# Patient Record
Sex: FEMALE | Race: WHITE | NOT HISPANIC OR LATINO | Employment: FULL TIME | URBAN - METROPOLITAN AREA
[De-identification: names, ages, dates, MRNs, and addresses within clinical notes are randomized per-mention and may not be internally consistent; named-entity substitution may affect disease eponyms.]

---

## 2022-10-09 ENCOUNTER — OFFICE VISIT (OUTPATIENT)
Dept: URGENT CARE | Facility: PHYSICIAN GROUP | Age: 62
End: 2022-10-09
Payer: COMMERCIAL

## 2022-10-09 ENCOUNTER — HOSPITAL ENCOUNTER (OUTPATIENT)
Dept: RADIOLOGY | Facility: MEDICAL CENTER | Age: 62
End: 2022-10-09
Attending: FAMILY MEDICINE
Payer: COMMERCIAL

## 2022-10-09 VITALS
RESPIRATION RATE: 20 BRPM | OXYGEN SATURATION: 94 % | SYSTOLIC BLOOD PRESSURE: 120 MMHG | HEART RATE: 97 BPM | HEIGHT: 63 IN | TEMPERATURE: 98.1 F | BODY MASS INDEX: 41.64 KG/M2 | WEIGHT: 235 LBS | DIASTOLIC BLOOD PRESSURE: 62 MMHG

## 2022-10-09 DIAGNOSIS — S80.02XA CONTUSION OF LEFT KNEE, INITIAL ENCOUNTER: ICD-10-CM

## 2022-10-09 DIAGNOSIS — S89.92XA INJURY OF LEFT KNEE, INITIAL ENCOUNTER: ICD-10-CM

## 2022-10-09 DIAGNOSIS — M25.562 LATERAL JOINT LINE TENDERNESS OF KNEE, LEFT: ICD-10-CM

## 2022-10-09 PROCEDURE — 99203 OFFICE O/P NEW LOW 30 MIN: CPT | Performed by: FAMILY MEDICINE

## 2022-10-09 PROCEDURE — 73564 X-RAY EXAM KNEE 4 OR MORE: CPT | Mod: LT

## 2022-10-09 RX ORDER — LATANOPROST 50 UG/ML
1 SOLUTION/ DROPS OPHTHALMIC PRN
COMMUNITY
Start: 2022-09-03 | End: 2023-06-04

## 2022-10-09 RX ORDER — KETOROLAC TROMETHAMINE 30 MG/ML
30 INJECTION, SOLUTION INTRAMUSCULAR; INTRAVENOUS ONCE
Status: COMPLETED | OUTPATIENT
Start: 2022-10-09 | End: 2022-10-09

## 2022-10-09 RX ORDER — MELOXICAM 15 MG/1
15 TABLET ORAL DAILY
Qty: 30 TABLET | Refills: 0 | Status: SHIPPED | OUTPATIENT
Start: 2022-10-09 | End: 2022-11-08

## 2022-10-09 RX ORDER — METOPROLOL TARTRATE AND HYDROCHLOROTHIAZIDE 100; 25 MG/1; MG/1
1 TABLET ORAL PRN
COMMUNITY
End: 2023-02-07

## 2022-10-09 RX ADMIN — KETOROLAC TROMETHAMINE 30 MG: 30 INJECTION, SOLUTION INTRAMUSCULAR; INTRAVENOUS at 16:35

## 2022-10-12 ASSESSMENT — ENCOUNTER SYMPTOMS
SENSORY CHANGE: 0
FOCAL WEAKNESS: 0
WEIGHT LOSS: 0
ROS SKIN COMMENTS: NO ABRASION OR LACERATION
MYALGIAS: 0

## 2022-10-12 NOTE — PROGRESS NOTES
"Subjective     Leida Garcia is a 62 y.o. female who presents with Knee Injury (Fell on ground. Hit L knee on ground/Swelling and redness present./Onset 1 hour)            Onset today ground-level fall striking left knee to ground.  Lateral and posterior pain is moderate to severe.  Pain radiates to leg.  No sensation of locking.  No prior injury or surgery.  No OTC medications.  No other aggravating or alleviating factors.      Review of Systems   Constitutional:  Negative for malaise/fatigue and weight loss.   Musculoskeletal:  Negative for joint pain and myalgias.   Skin:         No abrasion or laceration     Neurological:  Negative for sensory change and focal weakness.            Objective     /62 (BP Location: Right arm, Patient Position: Sitting, BP Cuff Size: Large adult long)   Pulse 97   Temp 36.7 °C (98.1 °F) (Temporal)   Resp 20   Ht 1.6 m (5' 3\")   Wt 107 kg (235 lb)   SpO2 94%   BMI 41.63 kg/m²      Physical Exam  Constitutional:       Appearance: Normal appearance.   Musculoskeletal:      Comments: Left knee: Tender posterior and lateral aspect.  Tender proximal tibia.  Tender lateral joint line.  Range of motion is intact.  No obvious effusion.  Stable.  Distal neurovascular intact.   Skin:     General: Skin is warm and dry.   Neurological:      Mental Status: She is alert.                           Assessment & Plan   Xray: no fracture or dislocation per radiology       1. Injury of left knee, initial encounter    - DX-KNEE COMPLETE 4+ LEFT; Future  - ketorolac (TORADOL) injection 30 mg    2. Contusion of left knee, initial encounter    - Referral to Orthopedics  - meloxicam (MOBIC) 15 MG tablet; Take 1 Tablet by mouth every day for 30 days.  Dispense: 30 Tablet; Refill: 0    3. Lateral joint line tenderness of knee, left    - Referral to Orthopedics     Differential diagnosis, natural history, supportive care, and indications for immediate follow-up discussed at length. "     Relative rest, ice, nsaid prn. Elevation and compression prn swelling. Resume activity as tolerated.    Knee brace as needed.  Follow-up Ortho.

## 2023-02-07 PROBLEM — S83.232A COMPLEX TEAR OF MEDIAL MENISCUS OF LEFT KNEE AS CURRENT INJURY: Status: ACTIVE | Noted: 2023-02-07

## 2023-06-04 ENCOUNTER — OFFICE VISIT (OUTPATIENT)
Dept: URGENT CARE | Facility: PHYSICIAN GROUP | Age: 63
End: 2023-06-04
Payer: COMMERCIAL

## 2023-06-04 VITALS
BODY MASS INDEX: 40.12 KG/M2 | DIASTOLIC BLOOD PRESSURE: 82 MMHG | WEIGHT: 235 LBS | TEMPERATURE: 97.7 F | SYSTOLIC BLOOD PRESSURE: 140 MMHG | HEIGHT: 64 IN | RESPIRATION RATE: 16 BRPM | OXYGEN SATURATION: 96 % | HEART RATE: 87 BPM

## 2023-06-04 DIAGNOSIS — R00.0 TACHYCARDIA: ICD-10-CM

## 2023-06-04 DIAGNOSIS — I10 HYPERTENSION, UNSPECIFIED TYPE: ICD-10-CM

## 2023-06-04 PROCEDURE — 99214 OFFICE O/P EST MOD 30 MIN: CPT | Performed by: FAMILY MEDICINE

## 2023-06-04 PROCEDURE — 3077F SYST BP >= 140 MM HG: CPT | Performed by: FAMILY MEDICINE

## 2023-06-04 PROCEDURE — 3079F DIAST BP 80-89 MM HG: CPT | Performed by: FAMILY MEDICINE

## 2023-06-04 NOTE — PROGRESS NOTES
"  Subjective:      62 y.o. female presents to urgent care for refill of her metoprolol.  She is 25 mg 2 times daily.  Has been doing this off and on for years.  Most recently has been doing it since March 2023.  She took her last pill last night.  States her heart rate increases fairly significantly with stress and anxiety.  She is going through a lawsuit currently which is causing her increased stress and anxiety.  This also helps keep her blood pressure under control.  She denies any chest pain, palpitations, or shortness of breath presently.  She drinks 1-2 caffeinated beverages per day, 0-2 alcoholic beverages per week.  She does not get formal exercise.  She does try to eat mostly homemade foods.    She denies any other questions or concerns at this time.    Current problem list, medication, and past medical/surgical history were reviewed in Epic.    ROS  See HPI     Objective:      BP (!) 140/82 (BP Location: Right arm, Patient Position: Sitting, BP Cuff Size: Adult long)   Pulse 87   Temp 36.5 °C (97.7 °F) (Temporal)   Resp 16   Ht 1.626 m (5' 4\")   Wt 107 kg (235 lb)   SpO2 96%   BMI 40.34 kg/m²     Physical Exam  Constitutional:       General: She is not in acute distress.     Appearance: She is not diaphoretic.   Cardiovascular:      Rate and Rhythm: Normal rate and regular rhythm.      Heart sounds: Normal heart sounds.   Pulmonary:      Effort: Pulmonary effort is normal. No respiratory distress.      Breath sounds: Normal breath sounds.   Neurological:      Mental Status: She is alert.   Psychiatric:         Mood and Affect: Affect normal.         Judgment: Judgment normal.       Assessment/Plan:     1. Hypertension, unspecified type  2. Tachycardia  1 month supply of metoprolol has been provided.  Referral to establish care with PCP has been placed.  - Referral to establish with [facility] PCP  - metoprolol tartrate (LOPRESSOR) 25 MG Tab; Take 1 Tablet by mouth 2 times a day.  Dispense: 60 Tablet; " Refill: 0      Instructed to return to Urgent Care or nearest Emergency Department if symptoms fail to improve, for any change in condition, further concerns, or new concerning symptoms. Patient states understanding of the plan of care and discharge instructions.    Kourtney Ornelas M.D.

## 2023-06-20 ENCOUNTER — OFFICE VISIT (OUTPATIENT)
Dept: MEDICAL GROUP | Facility: PHYSICIAN GROUP | Age: 63
End: 2023-06-20
Attending: FAMILY MEDICINE
Payer: COMMERCIAL

## 2023-06-20 VITALS
HEIGHT: 64 IN | TEMPERATURE: 97.3 F | HEART RATE: 71 BPM | RESPIRATION RATE: 16 BRPM | OXYGEN SATURATION: 97 % | DIASTOLIC BLOOD PRESSURE: 74 MMHG | SYSTOLIC BLOOD PRESSURE: 128 MMHG | WEIGHT: 261 LBS | BODY MASS INDEX: 44.56 KG/M2

## 2023-06-20 DIAGNOSIS — Z76.89 ENCOUNTER TO ESTABLISH CARE: ICD-10-CM

## 2023-06-20 DIAGNOSIS — I10 HYPERTENSION, UNSPECIFIED TYPE: ICD-10-CM

## 2023-06-20 DIAGNOSIS — E66.01 MORBID OBESITY WITH BMI OF 40.0-44.9, ADULT (HCC): ICD-10-CM

## 2023-06-20 DIAGNOSIS — Z12.31 BREAST CANCER SCREENING BY MAMMOGRAM: ICD-10-CM

## 2023-06-20 DIAGNOSIS — R00.0 TACHYCARDIA: ICD-10-CM

## 2023-06-20 DIAGNOSIS — F32.9 REACTIVE DEPRESSION: ICD-10-CM

## 2023-06-20 DIAGNOSIS — H61.21 EXCESSIVE CERUMEN IN RIGHT EAR CANAL: ICD-10-CM

## 2023-06-20 DIAGNOSIS — Z11.59 NEED FOR HEPATITIS C SCREENING TEST: ICD-10-CM

## 2023-06-20 DIAGNOSIS — Z13.228 SCREENING FOR METABOLIC DISORDER: ICD-10-CM

## 2023-06-20 DIAGNOSIS — J45.20 MILD INTERMITTENT ASTHMA WITHOUT COMPLICATION: ICD-10-CM

## 2023-06-20 DIAGNOSIS — Z12.11 COLON CANCER SCREENING: ICD-10-CM

## 2023-06-20 PROCEDURE — 3078F DIAST BP <80 MM HG: CPT | Performed by: NURSE PRACTITIONER

## 2023-06-20 PROCEDURE — 99214 OFFICE O/P EST MOD 30 MIN: CPT | Performed by: NURSE PRACTITIONER

## 2023-06-20 PROCEDURE — 3074F SYST BP LT 130 MM HG: CPT | Performed by: NURSE PRACTITIONER

## 2023-06-20 RX ORDER — DIPHENHYDRAMINE HCL 25 MG
25 CAPSULE ORAL EVERY 6 HOURS PRN
COMMUNITY

## 2023-06-20 RX ORDER — VITAMIN B COMPLEX
1000 TABLET ORAL DAILY
COMMUNITY

## 2023-06-20 RX ORDER — CALCIUM CARBONATE 260MG(650)
TABLET,CHEWABLE ORAL
COMMUNITY
End: 2023-08-17

## 2023-06-20 RX ORDER — ALBUTEROL SULFATE 90 UG/1
2 AEROSOL, METERED RESPIRATORY (INHALATION) EVERY 4 HOURS PRN
Qty: 1 EACH | Refills: 1 | Status: SHIPPED | OUTPATIENT
Start: 2023-06-20 | End: 2024-03-12

## 2023-06-20 ASSESSMENT — ENCOUNTER SYMPTOMS
MUSCULOSKELETAL NEGATIVE: 1
NEUROLOGICAL NEGATIVE: 1
DEPRESSION: 1
EYES NEGATIVE: 1
GASTROINTESTINAL NEGATIVE: 1
FEVER: 0
COUGH: 0
PALPITATIONS: 0
CONSTITUTIONAL NEGATIVE: 1
SPUTUM PRODUCTION: 0
SHORTNESS OF BREATH: 0

## 2023-06-20 NOTE — ASSESSMENT & PLAN NOTE
Reports diagnosed with depression in her 20s; was on paxil, prozac in the past; not currently on any medication for years. She was in therapy in the past as well; she would like see therapist again; lost her sister unexpectedly yesterday. Denies SI/self harm  - referral submitted to see psychology for therapy; she will let me know if she finds therapist in her insurance network that practices 'mind mapping'

## 2023-06-20 NOTE — ASSESSMENT & PLAN NOTE
Diagnosed hypertension, tachycardia around 2011; had been on Metoprolol Tart 50mg BID for several years, then reduced to 25mg BID for a few years; states in 2020 she was doing other interventions like deep breathing which helped with tachycardia and she took a break from medication. She is recently in legal lawsuit at work and under added stress; states she started feeling elevated HR and reached out to [facility] via her insurance to re-start medication.   BP on clinic today 128/74;  HR 71; denies CP, SOB, palpitations, dizziness, edema  - continue Metoprolol Tart 25mg BID; refilled today   - EKG done pre-op with DILCIA 3/2023; NSR, no arrhythmia  - monitor annual fasting CMP/lipid- ordered today

## 2023-06-20 NOTE — ASSESSMENT & PLAN NOTE
Reports history of exercise induced asthma for several years. Now usually has flare up during spring time and uses as needed albuterol.  Last hospitalization related to asthma exacerbation was in  from smoke exposure. BS clear on exam today. No SOB, cough, dyspnea reported today. She last used albuterol yesterday.  - continue prn albuterol; current inhaler  and will refill today

## 2023-06-20 NOTE — PROGRESS NOTES
Subjective       CC:    Chief Complaint   Patient presents with    New Patient        HISTORY OF THE PRESENT ILLNESS: Patient is a 62 y.o. female, here today to establish care. Prior PCP was none since she relocated from [place] in 2020. She has a medical history of tachycardia, hypertension, depression, asthma.   She also had left meniscus injury around 10/2022, she is being seen at [facility]. They plan to do surgical repair in a few months.   She is due for preventative screens and labs.     The below problems were discussed/reviewed at this visit:    Problem   Hypertension   Tachycardia   Mild Intermittent Asthma Without Complication   Reactive Depression   Morbid Obesity With Bmi of 40.0-44.9, Adult ([facility])       Patient Active Problem List   Diagnosis    Complex tear of medial meniscus of left knee as current injury    Hypertension    Tachycardia    Mild intermittent asthma without complication    Reactive depression    Morbid obesity with BMI of 40.0-44.9, adult ([facility])       History reviewed. No pertinent past medical history.     Current Outpatient Medications Ordered in Epic   Medication Sig Dispense Refill    Potassium 95 MG Tab Take  by mouth.      Zinc 10 MG Lozenge Dissolve  in the mouth.      MELATONIN-CARMEN-VALERIAN PO Take  by mouth.      vitamin D3 (CHOLECALCIFEROL) 1000 Unit (25 mcg) Tab Take 1,000 Units by mouth every day.      diphenhydrAMINE (BENADRYL ALLERGY) 25 MG capsule Take 25 mg by mouth every 6 hours as needed.      metoprolol tartrate (LOPRESSOR) 25 MG Tab Take 1 Tablet by mouth 2 times a day. 60 Tablet 0    albuterol 108 (90 Base) MCG/ACT Aero Soln inhalation aerosol Inhale 2 Puffs every four hours as needed for Shortness of Breath. 1 Each 1     No current [facility]-ordered facility-administered medications on file.        Past Surgical History:   Procedure Laterality Date    PRIMARY C SECTION      x 4        Allergies:  Ciprofloxacin, Keflex, and Sulfa drugs    Health Maintenance:  "Completed  M2; Menopause early 50s   x4   Last PAP about 4 years; no abnormal PAP results; she will schedule when ready    ROS:   Review of Systems   Constitutional: Negative.  Negative for fever and malaise/fatigue.   HENT:  Positive for ear pain.    Eyes: Negative.    Respiratory:  Negative for cough, sputum production and shortness of breath.    Cardiovascular:  Negative for chest pain, palpitations and leg swelling.   Gastrointestinal: Negative.    Genitourinary: Negative.    Musculoskeletal: Negative.    Neurological: Negative.    Endo/Heme/Allergies: Negative.    Psychiatric/Behavioral:  Positive for depression.          Objective       Exam: /74   Pulse 71   Temp 36.3 °C (97.3 °F) (Temporal)   Resp 16   Ht 1.626 m (5' 4\")   Wt 118 kg (261 lb)   SpO2 97%  Body mass index is 44.8 kg/m².    Physical Exam  Constitutional:       Appearance: Normal appearance.   HENT:      Right Ear: External ear normal. There is impacted cerumen.      Left Ear: Tympanic membrane, ear canal and external ear normal.   Cardiovascular:      Rate and Rhythm: Normal rate and regular rhythm.      Pulses: Normal pulses.      Heart sounds: Normal heart sounds.   Pulmonary:      Effort: Pulmonary effort is normal.      Breath sounds: Normal breath sounds.   Musculoskeletal:         General: Normal range of motion.      Cervical back: Normal range of motion and neck supple.      Right lower leg: No edema.      Left lower leg: No edema.   Skin:     General: Skin is warm and dry.   Neurological:      General: No focal deficit present.      Mental Status: She is alert and oriented to person, place, and time.   Psychiatric:         Mood and Affect: Mood normal.         Behavior: Behavior normal.         Thought Content: Thought content normal.         Judgment: Judgment normal.         Assessment & Plan     62 y.o. female with the following -    Problem List Items Addressed This Visit       Hypertension     Diagnosed " hypertension, tachycardia around ; had been on Metoprolol Tart 50mg BID for several years, then reduced to 25mg BID for a few years; states in  she was doing other interventions like deep breathing which helped with tachycardia and she took a break from medication. She is recently in legal lawsuit at work and under added stress; states she started feeling elevated HR and reached out to [facility] via her insurance to re-start medication.   BP on clinic today 128/74;  HR 71; denies CP, SOB, palpitations, dizziness, edema  - continue Metoprolol Tart 25mg BID; refilled today   - EKG done pre-op with DILCIA 3/2023; NSR, no arrhythmia  - monitor annual fasting CMP/lipid- ordered today          Relevant Medications    metoprolol tartrate (LOPRESSOR) 25 MG Tab    Tachycardia     Chronic since ; stable on Metoprolol 25mg BID         Relevant Medications    metoprolol tartrate (LOPRESSOR) 25 MG Tab    Mild intermittent asthma without complication     Reports history of exercise induced asthma for several years. Now usually has flare up during spring time and uses as needed albuterol.  Last hospitalization related to asthma exacerbation was in  from smoke exposure. BS clear on exam today. No SOB, cough, dyspnea reported today. She last used albuterol yesterday.  - continue prn albuterol; current inhaler  and will refill today         Relevant Medications    albuterol 108 (90 Base) MCG/ACT Aero Soln inhalation aerosol    Reactive depression     Reports diagnosed with depression in her 20s; was on paxil, prozac in the past; not currently on any medication for years. She was in therapy in the past as well; she would like see therapist again; lost her sister unexpectedly yesterday. Denies SI/self harm  - referral submitted to see psychology for therapy; she will let me know if she finds therapist in her insurance network that practices 'mind mapping'            Relevant Orders    Referral to Psychology    Morbid  obesity with BMI of 40.0-44.9, adult (HCC)    Relevant Orders    Patient identified as having weight management issue.  Appropriate orders and counseling given.     Other Visit Diagnoses       Colon cancer screening        Relevant Orders    COLOGUARD (FIT DNA)    Breast cancer screening by mammogram        Relevant Orders    MA-SCREENING MAMMO BILAT W/TOMOSYNTHESIS W/CAD    Need for hepatitis C screening test        Relevant Orders    HCV Scrn ( [id number] 1xLife - Medicare Patients Only)    Screening for metabolic disorder        Relevant Orders    HEMOGLOBIN A1C    TSH WITH REFLEX TO FT4    Lipid Profile    CBC WITHOUT DIFFERENTIAL    Excessive cerumen in right ear canal        Left ear bothering her; excessive wax in her right ear, to be irrigated by MA    Relevant Orders    Ear Irrigation (MA Only)    Encounter to establish care                Medications Prescribed Today:  1. Hypertension, unspecified type  - metoprolol tartrate (LOPRESSOR) 25 MG Tab; Take 1 Tablet by mouth 2 times a day.  Dispense: 60 Tablet; Refill: 0    2. Tachycardia  - metoprolol tartrate (LOPRESSOR) 25 MG Tab; Take 1 Tablet by mouth 2 times a day.  Dispense: 60 Tablet; Refill: 0    3. Mild intermittent asthma without complication  - albuterol 108 (90 Base) MCG/ACT Aero Soln inhalation aerosol; Inhale 2 Puffs every four hours as needed for Shortness of Breath.  Dispense: 1 Each; Refill: 1    Educated in proper administration of medication(s) ordered today including safety, possible SE, risks, benefits, rationale and alternatives to therapy.     Return in about 4 weeks (around 2023) for PAP , Annual Visit.    Please note that this dictation was created using voice recognition software. I have made every reasonable attempt to correct obvious errors, but I expect that there are errors of grammar and possibly content that I did not discover before finalizing the note.

## 2023-07-14 ENCOUNTER — APPOINTMENT (OUTPATIENT)
Dept: MEDICAL GROUP | Facility: PHYSICIAN GROUP | Age: 63
End: 2023-07-14
Payer: COMMERCIAL

## 2023-07-24 DIAGNOSIS — I10 HYPERTENSION, UNSPECIFIED TYPE: ICD-10-CM

## 2023-07-24 DIAGNOSIS — R00.0 TACHYCARDIA: ICD-10-CM

## 2023-07-26 ENCOUNTER — HOSPITAL ENCOUNTER (OUTPATIENT)
Dept: LAB | Facility: MEDICAL CENTER | Age: 63
End: 2023-07-26
Attending: NURSE PRACTITIONER
Payer: COMMERCIAL

## 2023-07-26 DIAGNOSIS — Z11.59 NEED FOR HEPATITIS C SCREENING TEST: ICD-10-CM

## 2023-07-26 DIAGNOSIS — Z13.228 SCREENING FOR METABOLIC DISORDER: ICD-10-CM

## 2023-07-26 LAB
CHOLEST SERPL-MCNC: 213 MG/DL (ref 100–199)
ERYTHROCYTE [DISTWIDTH] IN BLOOD BY AUTOMATED COUNT: 45.9 FL (ref 35.9–50)
EST. AVERAGE GLUCOSE BLD GHB EST-MCNC: 108 MG/DL
FASTING STATUS PATIENT QL REPORTED: NORMAL
HBA1C MFR BLD: 5.4 % (ref 4–5.6)
HCT VFR BLD AUTO: 46.6 % (ref 37–47)
HCV AB SER QL: NORMAL
HDLC SERPL-MCNC: 59 MG/DL
HGB BLD-MCNC: 15.4 G/DL (ref 12–16)
LDLC SERPL CALC-MCNC: 131 MG/DL
MCH RBC QN AUTO: 32.3 PG (ref 27–33)
MCHC RBC AUTO-ENTMCNC: 33 G/DL (ref 32.2–35.5)
MCV RBC AUTO: 97.7 FL (ref 81.4–97.8)
PLATELET # BLD AUTO: 239 K/UL (ref 164–446)
PMV BLD AUTO: 10.7 FL (ref 9–12.9)
RBC # BLD AUTO: 4.77 M/UL (ref 4.2–5.4)
TRIGL SERPL-MCNC: 116 MG/DL (ref 0–149)
TSH SERPL DL<=0.005 MIU/L-ACNC: 1.88 UIU/ML (ref 0.38–5.33)
WBC # BLD AUTO: 6.2 K/UL (ref 4.8–10.8)

## 2023-07-26 PROCEDURE — 83036 HEMOGLOBIN GLYCOSYLATED A1C: CPT

## 2023-07-26 PROCEDURE — 80061 LIPID PANEL: CPT

## 2023-07-26 PROCEDURE — 36415 COLL VENOUS BLD VENIPUNCTURE: CPT

## 2023-07-26 PROCEDURE — 84443 ASSAY THYROID STIM HORMONE: CPT

## 2023-07-26 PROCEDURE — 85027 COMPLETE CBC AUTOMATED: CPT

## 2023-07-26 PROCEDURE — 86803 HEPATITIS C AB TEST: CPT

## 2023-08-03 ENCOUNTER — HOSPITAL ENCOUNTER (OUTPATIENT)
Dept: RADIOLOGY | Facility: MEDICAL CENTER | Age: 63
End: 2023-08-03
Attending: NURSE PRACTITIONER
Payer: COMMERCIAL

## 2023-08-03 DIAGNOSIS — Z12.31 BREAST CANCER SCREENING BY MAMMOGRAM: ICD-10-CM

## 2023-08-03 PROCEDURE — 77063 BREAST TOMOSYNTHESIS BI: CPT

## 2023-08-04 SDOH — ECONOMIC STABILITY: FOOD INSECURITY: WITHIN THE PAST 12 MONTHS, THE FOOD YOU BOUGHT JUST DIDN'T LAST AND YOU DIDN'T HAVE MONEY TO GET MORE.: NEVER TRUE

## 2023-08-04 SDOH — ECONOMIC STABILITY: TRANSPORTATION INSECURITY
IN THE PAST 12 MONTHS, HAS THE LACK OF TRANSPORTATION KEPT YOU FROM MEDICAL APPOINTMENTS OR FROM GETTING MEDICATIONS?: NO

## 2023-08-04 SDOH — ECONOMIC STABILITY: FOOD INSECURITY: WITHIN THE PAST 12 MONTHS, YOU WORRIED THAT YOUR FOOD WOULD RUN OUT BEFORE YOU GOT MONEY TO BUY MORE.: NEVER TRUE

## 2023-08-04 SDOH — HEALTH STABILITY: PHYSICAL HEALTH: ON AVERAGE, HOW MANY DAYS PER WEEK DO YOU ENGAGE IN MODERATE TO STRENUOUS EXERCISE (LIKE A BRISK WALK)?: 0 DAYS

## 2023-08-04 SDOH — ECONOMIC STABILITY: INCOME INSECURITY: IN THE LAST 12 MONTHS, WAS THERE A TIME WHEN YOU WERE NOT ABLE TO PAY THE MORTGAGE OR RENT ON TIME?: NO

## 2023-08-04 SDOH — ECONOMIC STABILITY: HOUSING INSECURITY: IN THE LAST 12 MONTHS, HOW MANY PLACES HAVE YOU LIVED?: 1

## 2023-08-04 SDOH — ECONOMIC STABILITY: INCOME INSECURITY: HOW HARD IS IT FOR YOU TO PAY FOR THE VERY BASICS LIKE FOOD, HOUSING, MEDICAL CARE, AND HEATING?: NOT HARD AT ALL

## 2023-08-04 SDOH — ECONOMIC STABILITY: TRANSPORTATION INSECURITY
IN THE PAST 12 MONTHS, HAS LACK OF TRANSPORTATION KEPT YOU FROM MEETINGS, WORK, OR FROM GETTING THINGS NEEDED FOR DAILY LIVING?: NO

## 2023-08-04 SDOH — ECONOMIC STABILITY: HOUSING INSECURITY
IN THE LAST 12 MONTHS, WAS THERE A TIME WHEN YOU DID NOT HAVE A STEADY PLACE TO SLEEP OR SLEPT IN A SHELTER (INCLUDING NOW)?: NO

## 2023-08-04 SDOH — ECONOMIC STABILITY: TRANSPORTATION INSECURITY
IN THE PAST 12 MONTHS, HAS LACK OF RELIABLE TRANSPORTATION KEPT YOU FROM MEDICAL APPOINTMENTS, MEETINGS, WORK OR FROM GETTING THINGS NEEDED FOR DAILY LIVING?: NO

## 2023-08-04 SDOH — HEALTH STABILITY: PHYSICAL HEALTH: ON AVERAGE, HOW MANY MINUTES DO YOU ENGAGE IN EXERCISE AT THIS LEVEL?: 0 MIN

## 2023-08-04 SDOH — HEALTH STABILITY: MENTAL HEALTH
STRESS IS WHEN SOMEONE FEELS TENSE, NERVOUS, ANXIOUS, OR CAN'T SLEEP AT NIGHT BECAUSE THEIR MIND IS TROUBLED. HOW STRESSED ARE YOU?: VERY MUCH

## 2023-08-04 ASSESSMENT — SOCIAL DETERMINANTS OF HEALTH (SDOH)
HOW OFTEN DO YOU HAVE A DRINK CONTAINING ALCOHOL: 2-4 TIMES A MONTH
HOW OFTEN DO YOU GET TOGETHER WITH FRIENDS OR RELATIVES?: ONCE A WEEK
HOW OFTEN DO YOU HAVE SIX OR MORE DRINKS ON ONE OCCASION: LESS THAN MONTHLY
DO YOU BELONG TO ANY CLUBS OR ORGANIZATIONS SUCH AS CHURCH GROUPS UNIONS, FRATERNAL OR ATHLETIC GROUPS, OR SCHOOL GROUPS?: YES
HOW HARD IS IT FOR YOU TO PAY FOR THE VERY BASICS LIKE FOOD, HOUSING, MEDICAL CARE, AND HEATING?: NOT HARD AT ALL
WITHIN THE PAST 12 MONTHS, YOU WORRIED THAT YOUR FOOD WOULD RUN OUT BEFORE YOU GOT THE MONEY TO BUY MORE: NEVER TRUE
IN A TYPICAL WEEK, HOW MANY TIMES DO YOU TALK ON THE PHONE WITH FAMILY, FRIENDS, OR NEIGHBORS?: ONCE A WEEK
IN A TYPICAL WEEK, HOW MANY TIMES DO YOU TALK ON THE PHONE WITH FAMILY, FRIENDS, OR NEIGHBORS?: ONCE A WEEK
HOW OFTEN DO YOU GET TOGETHER WITH FRIENDS OR RELATIVES?: ONCE A WEEK
DO YOU BELONG TO ANY CLUBS OR ORGANIZATIONS SUCH AS CHURCH GROUPS UNIONS, FRATERNAL OR ATHLETIC GROUPS, OR SCHOOL GROUPS?: YES
HOW OFTEN DO YOU ATTENT MEETINGS OF THE CLUB OR ORGANIZATION YOU BELONG TO?: MORE THAN 4 TIMES PER YEAR
HOW OFTEN DO YOU ATTENT MEETINGS OF THE CLUB OR ORGANIZATION YOU BELONG TO?: MORE THAN 4 TIMES PER YEAR
HOW OFTEN DO YOU ATTEND CHURCH OR RELIGIOUS SERVICES?: MORE THAN 4 TIMES PER YEAR
HOW OFTEN DO YOU ATTEND CHURCH OR RELIGIOUS SERVICES?: MORE THAN 4 TIMES PER YEAR

## 2023-08-04 ASSESSMENT — LIFESTYLE VARIABLES
HOW OFTEN DO YOU HAVE A DRINK CONTAINING ALCOHOL: 2-4 TIMES A MONTH
HOW OFTEN DO YOU HAVE SIX OR MORE DRINKS ON ONE OCCASION: LESS THAN MONTHLY

## 2023-08-16 ENCOUNTER — HOSPITAL ENCOUNTER (OUTPATIENT)
Dept: RADIOLOGY | Facility: MEDICAL CENTER | Age: 63
End: 2023-08-16
Payer: COMMERCIAL

## 2023-08-17 ENCOUNTER — OFFICE VISIT (OUTPATIENT)
Dept: MEDICAL GROUP | Facility: PHYSICIAN GROUP | Age: 63
End: 2023-08-17
Payer: COMMERCIAL

## 2023-08-17 ENCOUNTER — HOSPITAL ENCOUNTER (OUTPATIENT)
Facility: MEDICAL CENTER | Age: 63
End: 2023-08-17
Attending: NURSE PRACTITIONER
Payer: COMMERCIAL

## 2023-08-17 VITALS
HEIGHT: 64 IN | RESPIRATION RATE: 16 BRPM | OXYGEN SATURATION: 92 % | SYSTOLIC BLOOD PRESSURE: 124 MMHG | DIASTOLIC BLOOD PRESSURE: 76 MMHG | TEMPERATURE: 98.7 F | HEART RATE: 83 BPM | WEIGHT: 258 LBS | BODY MASS INDEX: 44.05 KG/M2

## 2023-08-17 DIAGNOSIS — Z12.4 CERVICAL CANCER SCREENING: ICD-10-CM

## 2023-08-17 DIAGNOSIS — Z01.419 WELL WOMAN EXAM WITH ROUTINE GYNECOLOGICAL EXAM: ICD-10-CM

## 2023-08-17 DIAGNOSIS — M72.0 DUPUYTREN'S CONTRACTURE OF RIGHT HAND: ICD-10-CM

## 2023-08-17 DIAGNOSIS — Z00.00 ENCOUNTER FOR PREVENTATIVE ADULT HEALTH CARE EXAMINATION: ICD-10-CM

## 2023-08-17 PROCEDURE — 88175 CYTOPATH C/V AUTO FLUID REDO: CPT

## 2023-08-17 PROCEDURE — 3074F SYST BP LT 130 MM HG: CPT | Performed by: NURSE PRACTITIONER

## 2023-08-17 PROCEDURE — 3078F DIAST BP <80 MM HG: CPT | Performed by: NURSE PRACTITIONER

## 2023-08-17 PROCEDURE — 99396 PREV VISIT EST AGE 40-64: CPT | Performed by: NURSE PRACTITIONER

## 2023-08-17 PROCEDURE — 99000 SPECIMEN HANDLING OFFICE-LAB: CPT | Performed by: NURSE PRACTITIONER

## 2023-08-17 PROCEDURE — 87624 HPV HI-RISK TYP POOLED RSLT: CPT

## 2023-08-17 RX ORDER — GUAIFENESIN AND DEXTROMETHORPHAN HYDROBROMIDE 600; 30 MG/1; MG/1
TABLET, EXTENDED RELEASE ORAL
COMMUNITY

## 2023-08-17 RX ORDER — FOLIC ACID 1 MG/1
1 TABLET ORAL DAILY
COMMUNITY

## 2023-08-17 RX ORDER — MULTIVIT WITH MINERALS/LUTEIN
TABLET ORAL
COMMUNITY

## 2023-08-17 RX ORDER — NIACIN 100 MG
100 TABLET ORAL 2 TIMES DAILY
COMMUNITY

## 2023-08-17 RX ORDER — PSEUDOEPHEDRINE HCL 120 MG/1
120 TABLET, FILM COATED, EXTENDED RELEASE ORAL 2 TIMES DAILY PRN
COMMUNITY

## 2023-08-17 RX ORDER — ASCORBIC ACID 1000 MG
TABLET ORAL
COMMUNITY

## 2023-08-17 NOTE — PROGRESS NOTES
Subjective:     CC:   Chief Complaint   Patient presents with    Annual Exam     Would like to discuss ongoing issues with Arthritis, lung issues, contracture in hands    Lab Results       HPI:   62 y.o. female here today for annual exam with gynecological exam. She is feeling well and denies any complaints.    Ob-Gyn/ History:    Patient has GYN provider: no  /Para:  M2  Last Pap Smear:  about 4 years ago; no abnormal PAP results;  Gyn Surgery:   x4 .  Current Contraceptive Method:  Menopause early 50s  Post-menopausal bleeding: denies  Urinary incontinence: denies  Folate intake: none    Health Maintenance  Advanced directive: not currently   Osteoporosis Screen/ DEXA: start screen at age 65  PT/vit D for falls prevention: on daily vit D + calcium; no recent falls this year  Cholesterol Screening: TC//131  Diabetes Screening: A1C 5.4  Aspirin Use: none  Diet: heart healthy eating; adequate fiber  Exercise: recommend aerobic/resistance training 3-5x/week  Substance Abuse: denies  Safe in relationship.  Seat belts, bike helmet, gun safety discussed.  Sun protection used.    Cancer screening  Colorectal Cancer Screenin2023; -ve cologuard; recall in 3 years  Lung Cancer Screening: n/a; non smoker  Cervical Cancer Screening: last PAP 4 years ago; repeat today  Breast Cancer Screenin/3/2023, no malignancy on mammogram; recall in 2 years; routine SBE at home    Infectious disease screening/Immunizations  --STI Screening: declined  --Practices safe sex.  --HIV Screening: declined  --Hepatitis C Screening: neg 2023  --Immunizations: declined    She  has no past medical history on file.  She  has a past surgical history that includes primary c section.    Family History   Problem Relation Age of Onset    Lung Cancer Maternal Aunt        Social History     Socioeconomic History    Marital status:      Spouse name: Not on file    Number of children: Not on file    Years of  education: Not on file    Highest education level: Associate degree: academic program   Occupational History    Not on file   Tobacco Use    Smoking status: Never    Smokeless tobacco: Never   Vaping Use    Vaping Use: Never used   Substance and Sexual Activity    Alcohol use: Yes     Comment: occasional    Drug use: Yes     Types: Oral, Marijuana     Comment: CBD Gummies    Sexual activity: Yes     Partners: Male   Other Topics Concern    Not on file   Social History Narrative    Not on file     Social Determinants of Health     Financial Resource Strain: Low Risk  (8/4/2023)    Overall Financial Resource Strain (CARDIA)     Difficulty of Paying Living Expenses: Not hard at all   Food Insecurity: No Food Insecurity (8/4/2023)    Hunger Vital Sign     Worried About Running Out of Food in the Last Year: Never true     Ran Out of Food in the Last Year: Never true   Transportation Needs: No Transportation Needs (8/4/2023)    PRAPARE - Transportation     Lack of Transportation (Medical): No     Lack of Transportation (Non-Medical): No   Physical Activity: Inactive (8/4/2023)    Exercise Vital Sign     Days of Exercise per Week: 0 days     Minutes of Exercise per Session: 0 min   Stress: Stress Concern Present (8/4/2023)    [place] citizen [place] of Occupational Health - Occupational Stress Questionnaire     Feeling of Stress : Very much   Social Connections: Moderately Integrated (8/4/2023)    Social Connection and Isolation Panel [NHANES]     Frequency of Communication with Friends and Family: Once a week     Frequency of Social Gatherings with Friends and Family: Once a week     Attends Jewish Services: More than 4 times per year     Active Member of Clubs or Organizations: Yes     Attends Club or Organization Meetings: More than 4 times per year     Marital Status:    Intimate Partner Violence: Not on file   Housing Stability: Low Risk  (8/4/2023)    Housing Stability Vital Sign     Unable to Pay for Housing in  the Last Year: No     Number of Places Lived in the Last Year: 1     Unstable Housing in the Last Year: No       Patient Active Problem List    Diagnosis Date Noted    Dupuytren's contracture of right hand 08/18/2023    Hypertension 06/20/2023    Tachycardia 06/20/2023    Mild intermittent asthma without complication 06/20/2023    Reactive depression 06/20/2023    Morbid obesity with BMI of 40.0-44.9, adult ([facility]) 06/20/2023    Complex tear of medial meniscus of left knee as current injury 02/07/2023         Current Outpatient Medications   Medication Sig Dispense Refill    Ascorbic Acid (VITAMIN C) 1000 MG Tab Take  by mouth.      coenzyme Q-10 30 MG capsule Take 60 mg by mouth every day.      Ginkgo Biloba 40 MG Tab Take  by mouth.      niacin 100 MG Tab Take 100 mg by mouth 2 times a day.      folic acid (FOLVITE) 1 MG Tab Take 1 mg by mouth every day.      B Complex Vitamins (B COMPLEX 1 PO) Take  by mouth.      Dextromethorphan-guaiFENesin (MUCINEX DM)  MG TABLET SR 12 HR Take  by mouth.      pseudoephedrine SR (SUDAFED 12 HOUR) 120 MG TABLET SR 12 HR Take 120 mg by mouth 2 times a day as needed for Congestion.      Calcium-Magnesium-Zinc 333-133-5 MG Tab Take  by mouth.      Milk Thistle 1000 MG Cap Take  by mouth.      SELENIUM ER PO Take  by mouth.      Chromium Picolinate (CHROMIUM PICOLATE PO) Take  by mouth.      latanoprost (XALATAN) 0.005 % Solution INSTILL 1 DROP IN BOTH EYES NIGHTLY      metoprolol tartrate (LOPRESSOR) 25 MG Tab TAKE 1 TABLET BY MOUTH TWICE A  Tablet 3    Potassium 95 MG Tab Take  by mouth.      MELATONIN-CARMEN-VALERIAN PO Take  by mouth.      vitamin D3 (CHOLECALCIFEROL) 1000 Unit (25 mcg) Tab Take 1,000 Units by mouth every day.      diphenhydrAMINE (BENADRYL ALLERGY) 25 MG capsule Take 25 mg by mouth every 6 hours as needed. TAKE AT NIGHT      albuterol 108 (90 Base) MCG/ACT Aero Soln inhalation aerosol Inhale 2 Puffs every four hours as needed for Shortness of Breath. 1  "Each 1     No current facility-administered medications for this visit.     Allergies   Allergen Reactions    Ciprofloxacin Hives    Keflex Hives    Sulfa Drugs Hives       Review of Systems   Constitutional: Negative for fever, chills and malaise/fatigue.   HENT: Negative for congestion.    Eyes: Negative for pain.    Respiratory: Negative for cough and shortness of breath.  Cardiovascular: Negative for leg swelling.   Gastrointestinal: Negative for nausea, vomiting, abdominal pain and diarrhea.   Genitourinary: Negative for dysuria and hematuria.   Skin: Negative for rash.   Neurological: Negative for dizziness, focal weakness and headaches.   Endo/Heme/Allergies: Does not bleed easily.   Psychiatric/Behavioral: Negative for depression.  The patient is not nervous/anxious.      Objective:     /76   Pulse 83   Temp 37.1 °C (98.7 °F) (Temporal)   Resp 16   Ht 1.626 m (5' 4\")   Wt 117 kg (258 lb)   SpO2 92%   BMI 44.29 kg/m²   Body mass index is 44.29 kg/m².  Wt Readings from Last 4 Encounters:   08/17/23 117 kg (258 lb)   06/20/23 118 kg (261 lb)   06/04/23 107 kg (235 lb)   03/02/23 114 kg (251 lb 1.7 oz)       Physical Exam:  Constitutional: Well-developed and well-nourished. Not diaphoretic. No distress.   Skin: Skin is warm and dry. No rash noted.  Cardiovascular: Regular rate and rhythm, S1 and S2 without murmur, rubs, or gallops.  Lungs: Normal inspiratory effort, CTA bilaterally, no wheezes/rhonchi/rales  Breast: Breasts examined seated and supine. No skin changes, peau d'orange or nipple retraction. No discharge. No axillary or supraclavicular adenopathy. No masses or nodularity palpable.   Abdomen: Soft, non tender, and without distention. Active bowel sounds in all four quadrants. No rebound, guarding, masses or HSM.  :Perineum and external genitalia normal without rash. Vagina with normal and physiologic discharge. Cervix without visible lesions or discharge. Bimanual exam without adnexal " masses or cervical motion tenderness. Specimen collected from transformation zone   Extremities: No cyanosis, clubbing, erythema, nor edema. Distal pulses intact and symmetric.   Musculoskeletal: All major joints AROM full in all directions without pain.  Physical Exam  Musculoskeletal:        Hands:       Comments: Nodular lesions palpable below right finger #4; able to bend and fully extend all fingers       Neurological: Alert and oriented x 3. DTRs 2+/3 and symmetric. No cranial nerve deficit. 5/5 myotomes. Sensation intact.   Psychiatric:  Behavior, mood, and affect are appropriate.    A chaperone was offered to the patient during today's exam. Chaperone name: Cheri/MA was present.    Assessment and Plan:   1. Dupuytren's contracture of right hand  Nodular lesions noted base of finger #4 (palm); maintains full ROM in all fingers. She will let me know if this progresses and we can send to see hand specialist.     2. Cervical cancer screening  - Thinprep Pap with HPV; Future    3. Encounter for preventative adult health care examination  4. Well woman exam with routine gynecological exam  Normal pelvic/breast exam; f-up with results.  Health Care Maintenance:     Labs per orders  Immunizations per orders; declined today  Patient counseled about skin care, diet, supplements, vitamins, safe sex and exercise.      Follow-up: Return in about 3 months (around 11/17/2023) for htn, asthma.

## 2023-08-18 PROBLEM — M72.0 DUPUYTREN'S CONTRACTURE OF RIGHT HAND: Status: ACTIVE | Noted: 2023-08-18

## 2023-08-18 PROBLEM — M24.549 CONTRACTURE OF HAND: Status: ACTIVE | Noted: 2023-08-18

## 2023-08-18 NOTE — ASSESSMENT & PLAN NOTE
Nodular lesions noted base of finger #4 (palm); maintains full ROM in all fingers. She will let me know if this progresses and we can send to see hand specialist.

## 2023-08-22 LAB
CYTOLOGIST CVX/VAG CYTO: NORMAL
CYTOLOGY CVX/VAG DOC CYTO: NORMAL
CYTOLOGY CVX/VAG DOC THIN PREP: NORMAL
HPV I/H RISK 4 DNA CVX QL PROBE+SIG AMP: NEGATIVE
HPV REFLEX NL1174300: NORMAL
NOTE NL11727A: NORMAL
OTHER STN SPEC: NORMAL
QC REVIEWED BY NL11722A: NORMAL
STAT OF ADQ CVX/VAG CYTO-IMP: NORMAL

## 2023-11-10 ENCOUNTER — OFFICE VISIT (OUTPATIENT)
Dept: MEDICAL GROUP | Facility: PHYSICIAN GROUP | Age: 63
End: 2023-11-10
Payer: COMMERCIAL

## 2023-11-10 ENCOUNTER — HOSPITAL ENCOUNTER (OUTPATIENT)
Facility: MEDICAL CENTER | Age: 63
End: 2023-11-10
Attending: NURSE PRACTITIONER
Payer: COMMERCIAL

## 2023-11-10 VITALS
BODY MASS INDEX: 43.87 KG/M2 | RESPIRATION RATE: 20 BRPM | SYSTOLIC BLOOD PRESSURE: 118 MMHG | HEIGHT: 64 IN | TEMPERATURE: 99.2 F | OXYGEN SATURATION: 92 % | HEART RATE: 99 BPM | DIASTOLIC BLOOD PRESSURE: 74 MMHG | WEIGHT: 257 LBS

## 2023-11-10 DIAGNOSIS — J02.9 PHARYNGITIS, UNSPECIFIED ETIOLOGY: ICD-10-CM

## 2023-11-10 DIAGNOSIS — J01.10 ACUTE NON-RECURRENT FRONTAL SINUSITIS: ICD-10-CM

## 2023-11-10 LAB
FLUAV RNA SPEC QL NAA+PROBE: NEGATIVE
FLUBV RNA SPEC QL NAA+PROBE: NEGATIVE
RSV RNA SPEC QL NAA+PROBE: NEGATIVE
SARS-COV-2 RNA RESP QL NAA+PROBE: NOTDETECTED
SPECIMEN SOURCE: NORMAL

## 2023-11-10 PROCEDURE — 3074F SYST BP LT 130 MM HG: CPT | Performed by: NURSE PRACTITIONER

## 2023-11-10 PROCEDURE — 87070 CULTURE OTHR SPECIMN AEROBIC: CPT

## 2023-11-10 PROCEDURE — 99213 OFFICE O/P EST LOW 20 MIN: CPT | Performed by: NURSE PRACTITIONER

## 2023-11-10 PROCEDURE — 3078F DIAST BP <80 MM HG: CPT | Performed by: NURSE PRACTITIONER

## 2023-11-10 PROCEDURE — 0241U HCHG SARS-COV-2 COVID-19 NFCT DS RESP RNA 4 TRGT MIC: CPT

## 2023-11-10 RX ORDER — AZITHROMYCIN 250 MG/1
250 TABLET, FILM COATED ORAL DAILY
Qty: 6 TABLET | Refills: 0 | Status: SHIPPED | OUTPATIENT
Start: 2023-11-10

## 2023-11-10 RX ORDER — METHYLPREDNISOLONE 4 MG/1
TABLET ORAL
Qty: 21 TABLET | Refills: 0 | Status: SHIPPED | OUTPATIENT
Start: 2023-11-10

## 2023-11-10 ASSESSMENT — ENCOUNTER SYMPTOMS
PALPITATIONS: 0
SHORTNESS OF BREATH: 0
EYES NEGATIVE: 1
CONSTITUTIONAL NEGATIVE: 1
WHEEZING: 0
GASTROINTESTINAL NEGATIVE: 1
PSYCHIATRIC NEGATIVE: 1
COUGH: 1
MUSCULOSKELETAL NEGATIVE: 1
FEVER: 0
SPUTUM PRODUCTION: 0
NEUROLOGICAL NEGATIVE: 1
HEMOPTYSIS: 0
SORE THROAT: 1

## 2023-11-10 NOTE — PROGRESS NOTES
Subjective       CC:   Chief Complaint   Patient presents with    Hypertension Follow-up    Asthma     Shortness of breath, constant sinus pressure, heart rate increase resting laying down, palpatations, dizziness, coughing, takes mucinex, inhaler, COPD, concerns about low oxygen, feels like she can't get full breath, uses inhaler more than directed, pressure in head, ear down neck on L side, taking deep breath, felt ill  since Halloween        HPI:   Patient is a 63 y.o. established female patient with medical history of asthma, hypertension here today with complaints of productive cough, nasal congestion, pressure on the left side of her head with left ear tinnitus, post nasal drainage the past 2-3 weeks. Her asthma is managed on prn albuterol which she last used albuterol this morning. Denies CP, wheezing, dyspnea. No fever today, she felt warm some days ago.     Patient Active Problem List   Diagnosis    Complex tear of medial meniscus of left knee as current injury    Hypertension    Tachycardia    Mild intermittent asthma without complication    Reactive depression    Morbid obesity with BMI of 40.0-44.9, adult (HCC)    Dupuytren's contracture of right hand       History reviewed. No pertinent past medical history.     Past Surgical History:   Procedure Laterality Date    PRIMARY C SECTION      x 4        Current Outpatient Medications on File Prior to Visit   Medication Sig Dispense Refill    Ascorbic Acid (VITAMIN C) 1000 MG Tab Take  by mouth.      coenzyme Q-10 30 MG capsule Take 60 mg by mouth every day.      Ginkgo Biloba 40 MG Tab Take  by mouth.      niacin 100 MG Tab Take 100 mg by mouth 2 times a day.      folic acid (FOLVITE) 1 MG Tab Take 1 mg by mouth every day.      B Complex Vitamins (B COMPLEX 1 PO) Take  by mouth.      Dextromethorphan-guaiFENesin (MUCINEX DM)  MG TABLET SR 12 HR Take  by mouth.      pseudoephedrine SR (SUDAFED 12 HOUR) 120 MG TABLET SR 12 HR Take 120 mg by mouth 2 times  "a day as needed for Congestion.      Calcium-Magnesium-Zinc 333-133-5 MG Tab Take  by mouth.      Milk Thistle 1000 MG Cap Take  by mouth.      SELENIUM ER PO Take  by mouth.      Chromium Picolinate (CHROMIUM PICOLATE PO) Take  by mouth.      latanoprost (XALATAN) 0.005 % Solution INSTILL 1 DROP IN BOTH EYES NIGHTLY      metoprolol tartrate (LOPRESSOR) 25 MG Tab TAKE 1 TABLET BY MOUTH TWICE A  Tablet 3    Potassium 95 MG Tab Take  by mouth.      MELATONIN-CARMEN-VALERIAN PO Take  by mouth.      vitamin D3 (CHOLECALCIFEROL) 1000 Unit (25 mcg) Tab Take 1,000 Units by mouth every day.      diphenhydrAMINE (BENADRYL ALLERGY) 25 MG capsule Take 25 mg by mouth every 6 hours as needed. TAKE AT NIGHT      albuterol 108 (90 Base) MCG/ACT Aero Soln inhalation aerosol Inhale 2 Puffs every four hours as needed for Shortness of Breath. 1 Each 1     No current facility-administered medications on file prior to visit.      ROS:  Review of Systems   Constitutional: Negative.  Negative for fever and malaise/fatigue.   HENT:  Positive for congestion, sore throat and tinnitus.    Eyes: Negative.    Respiratory:  Positive for cough. Negative for hemoptysis, sputum production, shortness of breath and wheezing.    Cardiovascular:  Negative for chest pain, palpitations and leg swelling.   Gastrointestinal: Negative.    Genitourinary: Negative.    Musculoskeletal: Negative.    Neurological: Negative.    Endo/Heme/Allergies: Negative.    Psychiatric/Behavioral: Negative.         Objective       Exam:  /74   Pulse 99   Temp 37.3 °C (99.2 °F) (Temporal)   Resp 20   Ht 1.626 m (5' 4\")   Wt 117 kg (257 lb)   SpO2 92%   BMI 44.11 kg/m²  Body mass index is 44.11 kg/m².    Physical Exam  Constitutional:       Appearance: Normal appearance.   HENT:      Head: Normocephalic and atraumatic.      Right Ear: Hearing, tympanic membrane, ear canal and external ear normal.      Left Ear: Hearing, tympanic membrane, ear canal and " external ear normal.      Nose: Congestion present.      Right Sinus: Maxillary sinus tenderness and frontal sinus tenderness present.      Left Sinus: Maxillary sinus tenderness and frontal sinus tenderness present.      Mouth/Throat:      Lips: Pink.      Mouth: Mucous membranes are moist.      Pharynx: Uvula midline. Posterior oropharyngeal erythema present.      Comments: Tonsils surgically absent  Cardiovascular:      Rate and Rhythm: Normal rate and regular rhythm.   Pulmonary:      Effort: Pulmonary effort is normal.      Breath sounds: Normal breath sounds.   Neurological:      Mental Status: She is alert.         Assessment & Plan       63 y.o. female with the following -   1. Acute non-recurrent frontal sinusitis  2. Pharyngitis, unspecified etiology  Hx of asthma, hypertension here today with complaints of productive cough, nasal congestion, pressure on the left side of her head with left ear tinnitus, post nasal drainage the past 2-3 weeks. BS are clear on exam. She has tenderness around sinuses, reddened posterior pharynx. Will start treatment for sinusitis with antibiotics + medrol pack. Swabbed for COVID/FLU, STREP today. Will follow up results via mychart. She mentions having prolonged sinus infection in the past that required 1 month of antibiotics. She will let me know if symptoms are not improving by next week and we can send her to see ENT.     - azithromycin (ZITHROMAX) 250 MG Tab; Take 1 Tablet by mouth every day. Take 500mg on day 1; take 250mg daily for day 2-4  Dispense: 6 Tablet; Refill: 0  - methylPREDNISolone (MEDROL DOSEPAK) 4 MG Tablet Therapy Pack; As directed on the packaging label.  Dispense: 21 Tablet; Refill: 0    - CULTURE THROAT; Future  - CoV-2, Flu A/B, And RSV by PCR (Cepheid); Future    Educated in proper administration of medication(s) ordered today including safety, possible SE, risks, benefits, rationale and alternatives to therapy.     Return if symptoms worsen or fail to  improve.    Please note that this dictation was created using voice recognition software. I have made every reasonable attempt to correct obvious errors, but I expect that there are errors of grammar and possibly content that I did not discover before finalizing the note.

## 2023-11-12 LAB
BACTERIA SPEC RESP CULT: NORMAL
SIGNIFICANT IND 70042: NORMAL
SITE SITE: NORMAL
SOURCE SOURCE: NORMAL

## 2024-03-08 DIAGNOSIS — J45.20 MILD INTERMITTENT ASTHMA WITHOUT COMPLICATION: ICD-10-CM

## 2024-03-12 RX ORDER — ALBUTEROL SULFATE 90 UG/1
2 AEROSOL, METERED RESPIRATORY (INHALATION) EVERY 4 HOURS PRN
Qty: 8.5 EACH | Refills: 1 | Status: SHIPPED | OUTPATIENT
Start: 2024-03-12

## 2024-03-12 NOTE — TELEPHONE ENCOUNTER
Requested Prescriptions     Pending Prescriptions Disp Refills    albuterol 108 (90 Base) MCG/ACT Aero Soln inhalation aerosol [Pharmacy Med Name: ALBUTEROL HFA (PROAIR) INHALER] 8.5 Each 1     Sig: INHALE 2 PUFFS EVERY 4 HOURS AS NEEDED FOR SHORTNESS OF BREATH

## 2024-03-29 DIAGNOSIS — R00.0 TACHYCARDIA: ICD-10-CM

## 2024-03-29 DIAGNOSIS — I10 HYPERTENSION, UNSPECIFIED TYPE: ICD-10-CM

## 2024-04-01 NOTE — TELEPHONE ENCOUNTER
Received request via: Patient    Was the patient seen in the last year in this department? Yes    Does the patient have an active prescription (recently filled or refills available) for medication(s) requested? No    [facility]    Does the patient have USP Plus and need 100 day supply (blood pressure, diabetes and cholesterol meds only)? Patient does not have SCP

## 2024-04-02 ENCOUNTER — DOCUMENTATION (OUTPATIENT)
Dept: MEDICAL GROUP | Facility: PHYSICIAN GROUP | Age: 64
End: 2024-04-02
Payer: COMMERCIAL

## 2024-04-02 NOTE — PROGRESS NOTES
Message received from patient via modu that she increased metoprolol from 25mg BID to 50mg BID due to elevated BP from stress. Refills sent today.

## 2024-05-29 ENCOUNTER — DOCUMENTATION (OUTPATIENT)
Dept: HEALTH INFORMATION MANAGEMENT | Facility: OTHER | Age: 64
End: 2024-05-29
Payer: COMMERCIAL

## 2024-06-25 ENCOUNTER — OFFICE VISIT (OUTPATIENT)
Dept: URGENT CARE | Facility: PHYSICIAN GROUP | Age: 64
End: 2024-06-25
Payer: COMMERCIAL

## 2024-06-25 ENCOUNTER — APPOINTMENT (OUTPATIENT)
Dept: MEDICAL GROUP | Facility: PHYSICIAN GROUP | Age: 64
End: 2024-06-25
Payer: COMMERCIAL

## 2024-06-25 VITALS
HEART RATE: 84 BPM | TEMPERATURE: 99 F | RESPIRATION RATE: 24 BRPM | WEIGHT: 264.55 LBS | BODY MASS INDEX: 45.17 KG/M2 | SYSTOLIC BLOOD PRESSURE: 124 MMHG | HEIGHT: 64 IN | DIASTOLIC BLOOD PRESSURE: 74 MMHG | OXYGEN SATURATION: 98 %

## 2024-06-25 DIAGNOSIS — I10 HYPERTENSION, UNSPECIFIED TYPE: ICD-10-CM

## 2024-06-25 PROCEDURE — 99213 OFFICE O/P EST LOW 20 MIN: CPT | Performed by: FAMILY MEDICINE

## 2024-06-25 PROCEDURE — 3074F SYST BP LT 130 MM HG: CPT | Performed by: FAMILY MEDICINE

## 2024-06-25 PROCEDURE — 3078F DIAST BP <80 MM HG: CPT | Performed by: FAMILY MEDICINE

## 2024-06-25 ASSESSMENT — ENCOUNTER SYMPTOMS
EYE REDNESS: 0
NAUSEA: 0
EYE DISCHARGE: 0
WEIGHT LOSS: 0
MYALGIAS: 0
VOMITING: 0

## 2024-06-25 NOTE — PROGRESS NOTES
"Subjective     Leida Naye Garcia is a 63 y.o. female who presents with Hypertension (Refill for metoprolol, currently trying to establish with PCP. )            Presents for refill of hypertension medication.  Her hypertension has been well-controlled on metoprolol 50 mg twice daily for years.  She is unable to see primary care today.  No chest pain.  No headache.  No vision change.  No unilateral weakness.  Normal urine output.  No other aggravating or alleviating factors.        Review of Systems   Constitutional:  Negative for malaise/fatigue and weight loss.   Eyes:  Negative for discharge and redness.   Gastrointestinal:  Negative for nausea and vomiting.   Musculoskeletal:  Negative for joint pain and myalgias.   Skin:  Negative for itching and rash.              Objective     /74 (BP Location: Right arm, Patient Position: Sitting, BP Cuff Size: Large adult)   Pulse 84   Temp 37.2 °C (99 °F) (Temporal)   Resp (!) 24   Ht 1.626 m (5' 4\") Comment: Pt reported  Wt 120 kg (264 lb 8.8 oz)   SpO2 98%   BMI 45.41 kg/m²      Physical Exam  Constitutional:       General: She is not in acute distress.     Appearance: She is well-developed.   HENT:      Head: Normocephalic and atraumatic.   Eyes:      Conjunctiva/sclera: Conjunctivae normal.   Cardiovascular:      Rate and Rhythm: Normal rate and regular rhythm.      Heart sounds: Normal heart sounds. No murmur heard.  Pulmonary:      Effort: Pulmonary effort is normal.      Breath sounds: Normal breath sounds. No wheezing.   Musculoskeletal:      Right lower leg: No edema.      Left lower leg: No edema.   Skin:     General: Skin is warm and dry.      Findings: No rash.   Neurological:      Mental Status: She is alert.                             Assessment & Plan         1. Hypertension, unspecified type  metoprolol tartrate (LOPRESSOR) 25 MG Tab        Differential diagnosis, natural history, supportive care, and indications for immediate follow-up " were discussed.     F/u primary care

## 2024-07-03 ENCOUNTER — TELEPHONE (OUTPATIENT)
Dept: URGENT CARE | Facility: PHYSICIAN GROUP | Age: 64
End: 2024-07-03
Payer: COMMERCIAL

## 2024-10-01 ENCOUNTER — APPOINTMENT (OUTPATIENT)
Dept: MEDICAL GROUP | Facility: LAB | Age: 64
End: 2024-10-01
Payer: COMMERCIAL

## 2024-10-01 ENCOUNTER — HOSPITAL ENCOUNTER (OUTPATIENT)
Facility: MEDICAL CENTER | Age: 64
End: 2024-10-01
Attending: STUDENT IN AN ORGANIZED HEALTH CARE EDUCATION/TRAINING PROGRAM
Payer: COMMERCIAL

## 2024-10-01 VITALS
TEMPERATURE: 98.1 F | HEIGHT: 64 IN | HEART RATE: 81 BPM | BODY MASS INDEX: 44.73 KG/M2 | RESPIRATION RATE: 20 BRPM | WEIGHT: 262 LBS | DIASTOLIC BLOOD PRESSURE: 64 MMHG | SYSTOLIC BLOOD PRESSURE: 110 MMHG | OXYGEN SATURATION: 95 %

## 2024-10-01 DIAGNOSIS — I10 HYPERTENSION, UNSPECIFIED TYPE: ICD-10-CM

## 2024-10-01 DIAGNOSIS — M54.9 CHRONIC NECK AND BACK PAIN: ICD-10-CM

## 2024-10-01 DIAGNOSIS — G89.29 CHRONIC NECK AND BACK PAIN: ICD-10-CM

## 2024-10-01 DIAGNOSIS — R53.83 FATIGUE, UNSPECIFIED TYPE: ICD-10-CM

## 2024-10-01 DIAGNOSIS — J45.20 MILD INTERMITTENT ASTHMA WITHOUT COMPLICATION: ICD-10-CM

## 2024-10-01 DIAGNOSIS — Z91.89 AT RISK FOR SLEEP APNEA: ICD-10-CM

## 2024-10-01 DIAGNOSIS — R06.83 SNORING: ICD-10-CM

## 2024-10-01 DIAGNOSIS — R30.0 DYSURIA: ICD-10-CM

## 2024-10-01 DIAGNOSIS — E78.5 HYPERLIPIDEMIA, UNSPECIFIED HYPERLIPIDEMIA TYPE: ICD-10-CM

## 2024-10-01 DIAGNOSIS — M50.30 DDD (DEGENERATIVE DISC DISEASE), CERVICAL: ICD-10-CM

## 2024-10-01 DIAGNOSIS — M48.02 FORAMINAL STENOSIS OF CERVICAL REGION: ICD-10-CM

## 2024-10-01 DIAGNOSIS — Z78.9 KETOGENIC DIET: ICD-10-CM

## 2024-10-01 DIAGNOSIS — M54.2 CHRONIC NECK AND BACK PAIN: ICD-10-CM

## 2024-10-01 PROBLEM — R10.9 ABDOMINAL PAIN: Status: ACTIVE | Noted: 2024-10-01

## 2024-10-01 PROBLEM — K05.30 PERIODONTITIS: Chronic | Status: ACTIVE | Noted: 2023-12-07

## 2024-10-01 PROBLEM — R52 PAIN: Status: ACTIVE | Noted: 2024-10-01

## 2024-10-01 LAB
APPEARANCE UR: CLEAR
BILIRUB UR STRIP-MCNC: NEGATIVE MG/DL
COLOR UR AUTO: YELLOW
GLUCOSE UR STRIP.AUTO-MCNC: NEGATIVE MG/DL
KETONES UR STRIP.AUTO-MCNC: 15 MG/DL
LEUKOCYTE ESTERASE UR QL STRIP.AUTO: NEGATIVE
NITRITE UR QL STRIP.AUTO: NEGATIVE
PH UR STRIP.AUTO: 6.5 [PH] (ref 5–8)
PROT UR QL STRIP: NEGATIVE MG/DL
RBC UR QL AUTO: NEGATIVE
SP GR UR STRIP.AUTO: 1.01
UROBILINOGEN UR STRIP-MCNC: 0.2 MG/DL

## 2024-10-01 PROCEDURE — 81002 URINALYSIS NONAUTO W/O SCOPE: CPT | Performed by: STUDENT IN AN ORGANIZED HEALTH CARE EDUCATION/TRAINING PROGRAM

## 2024-10-01 PROCEDURE — 3074F SYST BP LT 130 MM HG: CPT | Performed by: STUDENT IN AN ORGANIZED HEALTH CARE EDUCATION/TRAINING PROGRAM

## 2024-10-01 PROCEDURE — 3078F DIAST BP <80 MM HG: CPT | Performed by: STUDENT IN AN ORGANIZED HEALTH CARE EDUCATION/TRAINING PROGRAM

## 2024-10-01 PROCEDURE — 99215 OFFICE O/P EST HI 40 MIN: CPT | Performed by: STUDENT IN AN ORGANIZED HEALTH CARE EDUCATION/TRAINING PROGRAM

## 2024-10-01 PROCEDURE — 87086 URINE CULTURE/COLONY COUNT: CPT

## 2024-10-01 RX ORDER — METOPROLOL TARTRATE 25 MG/1
50 TABLET, FILM COATED ORAL 2 TIMES DAILY
Qty: 360 TABLET | Refills: 3 | Status: SHIPPED | OUTPATIENT
Start: 2024-10-01

## 2024-10-01 RX ORDER — ALBUTEROL SULFATE 90 UG/1
2 INHALANT RESPIRATORY (INHALATION) EVERY 4 HOURS PRN
Qty: 8.5 EACH | Refills: 1 | Status: SHIPPED | OUTPATIENT
Start: 2024-10-01

## 2024-10-01 ASSESSMENT — ENCOUNTER SYMPTOMS
FEVER: 0
BACK PAIN: 1
ABDOMINAL PAIN: 0
WEIGHT LOSS: 1
SHORTNESS OF BREATH: 0
DIARRHEA: 0
SENSORY CHANGE: 1
VOMITING: 0
INSOMNIA: 1
HEARTBURN: 1
CHILLS: 0
NAUSEA: 0
TINGLING: 1
COUGH: 0

## 2024-10-01 ASSESSMENT — PATIENT HEALTH QUESTIONNAIRE - PHQ9
3. TROUBLE FALLING OR STAYING ASLEEP OR SLEEPING TOO MUCH: NOT AT ALL
1. LITTLE INTEREST OR PLEASURE IN DOING THINGS: NOT AT ALL
4. FEELING TIRED OR HAVING LITTLE ENERGY: NOT AT ALL
8. MOVING OR SPEAKING SO SLOWLY THAT OTHER PEOPLE COULD HAVE NOTICED. OR THE OPPOSITE, BEING SO FIGETY OR RESTLESS THAT YOU HAVE BEEN MOVING AROUND A LOT MORE THAN USUAL: NOT AT ALL
SUM OF ALL RESPONSES TO PHQ QUESTIONS 1-9: 0
2. FEELING DOWN, DEPRESSED, IRRITABLE, OR HOPELESS: NOT AT ALL
6. FEELING BAD ABOUT YOURSELF - OR THAT YOU ARE A FAILURE OR HAVE LET YOURSELF OR YOUR FAMILY DOWN: NOT AL ALL
7. TROUBLE CONCENTRATING ON THINGS, SUCH AS READING THE NEWSPAPER OR WATCHING TELEVISION: NOT AT ALL
9. THOUGHTS THAT YOU WOULD BE BETTER OFF DEAD, OR OF HURTING YOURSELF: NOT AT ALL
SUM OF ALL RESPONSES TO PHQ9 QUESTIONS 1 AND 2: 0
5. POOR APPETITE OR OVEREATING: NOT AT ALL

## 2024-10-02 LAB
BACTERIA UR CULT: NORMAL
SIGNIFICANT IND 70042: NORMAL
SITE SITE: NORMAL
SOURCE SOURCE: NORMAL

## 2024-10-25 ENCOUNTER — DOCUMENTATION (OUTPATIENT)
Dept: HEALTH INFORMATION MANAGEMENT | Facility: OTHER | Age: 64
End: 2024-10-25
Payer: COMMERCIAL

## 2024-12-12 ENCOUNTER — TELEPHONE (OUTPATIENT)
Dept: MEDICAL GROUP | Facility: LAB | Age: 64
End: 2024-12-12
Payer: COMMERCIAL

## 2024-12-12 NOTE — TELEPHONE ENCOUNTER
Phone Number Called: [phone] (home)     Call outcome: Left detailed message for patient. Informed to call back with any additional questions.    Message: Called and left a voice for patient regarding the matter of the PCP not being available during their appointment time did advise the patient to give us a call back at the earliest convenience to reschedule.

## 2024-12-21 DIAGNOSIS — R06.83 SNORING: ICD-10-CM

## 2024-12-21 DIAGNOSIS — Z91.89 AT RISK FOR SLEEP APNEA: ICD-10-CM

## 2024-12-30 ENCOUNTER — HOSPITAL ENCOUNTER (OUTPATIENT)
Dept: RADIOLOGY | Facility: MEDICAL CENTER | Age: 64
End: 2024-12-30
Payer: COMMERCIAL

## 2024-12-30 ENCOUNTER — OFFICE VISIT (OUTPATIENT)
Dept: URGENT CARE | Facility: PHYSICIAN GROUP | Age: 64
End: 2024-12-30
Payer: COMMERCIAL

## 2024-12-30 VITALS
TEMPERATURE: 98.1 F | HEIGHT: 64 IN | OXYGEN SATURATION: 94 % | HEART RATE: 78 BPM | SYSTOLIC BLOOD PRESSURE: 124 MMHG | BODY MASS INDEX: 44.04 KG/M2 | WEIGHT: 257.94 LBS | RESPIRATION RATE: 17 BRPM | DIASTOLIC BLOOD PRESSURE: 80 MMHG

## 2024-12-30 DIAGNOSIS — M79.641 PAIN OF RIGHT HAND: ICD-10-CM

## 2024-12-30 PROCEDURE — 73130 X-RAY EXAM OF HAND: CPT | Mod: RT

## 2024-12-30 ASSESSMENT — ENCOUNTER SYMPTOMS
NAUSEA: 0
DIZZINESS: 0
DIARRHEA: 0
SHORTNESS OF BREATH: 0
WEAKNESS: 0
FEVER: 0
COUGH: 0
VOMITING: 0

## 2024-12-30 NOTE — PROGRESS NOTES
Subjective     Leida Garcia is a 64 y.o. female who presents with Wrist Injury (R wrist pain following ground level fall on Saturday, catching herself on her hands. Head did hit ground, chipped her tooth (already examined).)            Romi is here today with her daughter complaining of right wrist/hand pain that started 2 days ago.  She notes that she tripped over her other daughter's orozco retriever and braced her fall with her wrist.  She notes that both wrists hurt initially however the right wrist pain has persisted and she is now having hand swelling.  She notes that she also hit her face and chipped one of her veneers.  She was seen by a dentist earlier today and had x-rays completed and there was no tooth fracture.  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  She is also having notable pain and swelling around her knuckles of her right hand.  She has tried ibuprofen and ice which have minimally helped.      Wrist Injury   The incident occurred 2 days ago. The incident occurred at home. The injury mechanism was a fall. The pain is present in the right hand and right wrist. The quality of the pain is described as aching. The pain does not radiate. Pertinent negatives include no chest pain. The symptoms are aggravated by movement and palpation. She has tried NSAIDs and ice for the symptoms. The treatment provided mild relief.       Review of Systems   Constitutional:  Negative for fever and malaise/fatigue.   HENT:  Negative for congestion.    Respiratory:  Negative for cough and shortness of breath.    Cardiovascular:  Negative for chest pain.   Gastrointestinal:  Negative for diarrhea, nausea and vomiting.   Musculoskeletal:         Right hand and wrist pain   Skin:  Negative for rash.   Neurological:  Negative for dizziness and weakness.   All other systems reviewed and are negative.        "      Objective     /80 (BP Location: Left arm, Patient Position: Sitting, BP Cuff Size: Large adult)   Pulse 78   Temp 36.7 °C (98.1 °F) (Temporal)   Resp 17   Ht 1.619 m (5' 3.75\")   Wt 117 kg (257 lb 15 oz)   SpO2 94%   BMI 44.62 kg/m²      Physical Exam  Vitals reviewed.   Constitutional:       Appearance: Normal appearance.   HENT:      Head: Normocephalic.      Nose: Nose normal.   Eyes:      Extraocular Movements: Extraocular movements intact.      Conjunctiva/sclera: Conjunctivae normal.   Cardiovascular:      Rate and Rhythm: Normal rate.   Pulmonary:      Effort: Pulmonary effort is normal.   Musculoskeletal:      Right hand: Swelling, tenderness and bony tenderness present. No deformity or lacerations. Decreased range of motion. Decreased strength of finger abduction, thumb/finger opposition and wrist extension. Normal sensation. Normal capillary refill. Normal pulse.      Comments: Swelling and tenderness to palpation overall MCP joints on dorsal side.  Right radial head tender to palpation on dorsal side.   Skin:     General: Skin is warm and dry.   Neurological:      Mental Status: She is alert and oriented to person, place, and time.   Psychiatric:         Behavior: Behavior normal.                             Assessment & Plan        Assessment & Plan  Pain of right hand    Orders:    DX-HAND 3+ RIGHT; Future          TECHNIQUE/EXAM DESCRIPTION AND NUMBER OF VIEWS:  3 views of the RIGHT hand.     COMPARISON: None     FINDINGS:  Dorsal soft tissue swelling of the hand and wrist.  Carpal alignment is preserved  No fracture or dislocation.  Narrowing of interphalangeal joints.  Mild narrowing of radiocarpal joint.     IMPRESSION:     1.  No fracture or dislocation of RIGHT hand.  2.  Dorsal soft tissue swelling.  3.  Mild osteoarthritis.    X-ray of hand and wrist was reviewed by radiology and agreed upon by myself and shows no acute fractures or dislocations.  Discussed with Romi and her " daughter that this is most likely a sprain and she should treat it with RICE therapy which includes rest, ice, compression, elevation.  Additionally she can continue taking ibuprofen and Tylenol as needed for pain.  Patient given ace wrap to utilize for compression and support.    If symptoms do not improve or worsen advised her to return to the clinic, present to her PCP for potential referral to orthopedics for further imaging.

## 2025-03-03 ENCOUNTER — PATIENT MESSAGE (OUTPATIENT)
Dept: HEALTH INFORMATION MANAGEMENT | Facility: OTHER | Age: 65
End: 2025-03-03

## 2025-03-25 ENCOUNTER — TELEPHONE (OUTPATIENT)
Dept: HEALTH INFORMATION MANAGEMENT | Facility: OTHER | Age: 65
End: 2025-03-25
Payer: COMMERCIAL

## 2025-07-29 DIAGNOSIS — I10 HYPERTENSION, UNSPECIFIED TYPE: ICD-10-CM

## 2025-07-29 RX ORDER — METOPROLOL TARTRATE 25 MG/1
50 TABLET, FILM COATED ORAL 2 TIMES DAILY
Qty: 360 TABLET | Refills: 0 | Status: SHIPPED | OUTPATIENT
Start: 2025-07-29